# Patient Record
Sex: MALE | Race: BLACK OR AFRICAN AMERICAN | NOT HISPANIC OR LATINO | Employment: OTHER | URBAN - METROPOLITAN AREA
[De-identification: names, ages, dates, MRNs, and addresses within clinical notes are randomized per-mention and may not be internally consistent; named-entity substitution may affect disease eponyms.]

---

## 2021-11-09 PROBLEM — R79.9 ELEVATED BUN: Status: ACTIVE | Noted: 2021-11-09

## 2021-11-09 PROBLEM — E78.5 HYPERLIPIDEMIA: Status: ACTIVE | Noted: 2019-09-10

## 2021-11-09 PROBLEM — I63.9 CEREBROVASCULAR ACCIDENT: Status: ACTIVE | Noted: 2019-09-10

## 2021-11-09 PROBLEM — E11.9 DIABETES MELLITUS: Status: ACTIVE | Noted: 2019-09-10

## 2021-11-09 PROBLEM — I10 HYPERTENSIVE DISORDER: Status: ACTIVE | Noted: 2019-11-19

## 2021-11-09 PROBLEM — R79.82 ELEVATED C-REACTIVE PROTEIN (CRP): Status: ACTIVE | Noted: 2021-11-09

## 2021-11-09 PROBLEM — R13.10 DYSPHAGIA: Status: ACTIVE | Noted: 2019-11-19

## 2021-11-09 PROBLEM — K21.9 GASTROESOPHAGEAL REFLUX DISEASE: Status: ACTIVE | Noted: 2019-11-19

## 2021-11-09 PROBLEM — Z93.1 STATUS POST GASTROSTOMY: Status: ACTIVE | Noted: 2019-11-19

## 2022-03-10 LAB — CRC RECOMMENDATION EXT: NORMAL

## 2022-03-16 PROBLEM — E78.5 HYPERLIPIDEMIA: Status: RESOLVED | Noted: 2019-09-10 | Resolved: 2022-03-16

## 2023-01-03 ENCOUNTER — PATIENT OUTREACH (OUTPATIENT)
Dept: ADMINISTRATIVE | Facility: HOSPITAL | Age: 63
End: 2023-01-03

## 2023-01-03 NOTE — PROGRESS NOTES
Population Health Outreach.Records Received, hyper-linked into chart at this time. The following record(s)  below were uploaded for Health Maintenance .    3/10/2022-colonoscopy

## 2023-01-11 DIAGNOSIS — E11.9 TYPE 2 DIABETES MELLITUS WITHOUT COMPLICATION: ICD-10-CM

## 2023-01-13 ENCOUNTER — PATIENT MESSAGE (OUTPATIENT)
Dept: ADMINISTRATIVE | Facility: HOSPITAL | Age: 63
End: 2023-01-13

## 2024-03-18 NOTE — HIM RECORD RETIREMENT NOTE
alf of Incomplete Medical Record    3/18/24    Patient Name: Daniel Medina  Contact Serial # (CSN): [id number]  Patient Medical Record # (MRN): [id number]  Date of Service: Orders Only on 1/11/2023  Physician Name: Loretta Ortega FNP [[id number]     This record has been reviewed and is being retired as incomplete by the approval of the  Medical Staff Operating Committee (MSOC)     On 02/08/2023., due to:  Unavailability of Provider     Missing Information/Comments:  []    Discharge Summary   []    DC Note/Short Stay Summary   []    ED Provider Note   []    Delivery Note   []    History & Physical   []   Operative Note   []     Procedure Note   []     Physician Order   [x]     Verbal Order   []       Other, specify: